# Patient Record
(demographics unavailable — no encounter records)

---

## 2025-03-12 NOTE — ASSESSMENT
[FreeTextEntry1] : 1. BPH -- with obstruction--improved with silodosin 2. LUTS --improved with silodosin 3. Elevated PVR -- improved on Silodosin   Plan: - Discussed PSA results, and importance of PSA screening - Maintain silodosin 8 MG PO QD - PSA 12 months - RTO 12 months - Patient has no other additional questions

## 2025-03-12 NOTE — HISTORY OF PRESENT ILLNESS
[Urinary Incontinence] : urinary incontinence [Urinary Frequency] : urinary frequency [Post-Void Dribbling] : post-void dribbling [None] : None [FreeTextEntry1] : 75-year-old male presents to the office for follow up of BPH/LUTS, s/p cystoscopy 08/2022 for hematuria, prostatic varices detected. He presents for PSA results. Patient is maintained on Silodosin 8 MG - is no longer on finasteride since 12/2022.  Patient reports doing well, he is using silodosin with improving urinary symptoms. He has no new urinary complaints. He reports good appetite, but he is not active as his likes because he has knee arthritis, and he is taking steroid injections, and he is told he needs partial knee replacement surgery.   He denies any fever, nausea, and other constitutional symptoms.   All past and present data reviewed: 04/2022 Urine Culture= negative 04/2022 Urine Cytology= negative for malignant cells  03/2022 urine analysis [PCP] = 10-20 red blood cells. 05/2023 UA= BLO trace intact   02/2025 PSA = 1.5 **** off proscar 02/2024 PSA = 2.4  **** off proscar 11/2022 PSA = 0.2  ***** proscar  12/2021 PSA = 0.2 ***** proscar //  BUN 18, CREAT 1.1, GFR 70 09/2020 PSA = 0.8 ***** proscar  05/2020 CT scan report reviewed= 4 mm right ureteral calculus. 06/2020 KUB= not  interpretable, see report. 06/2020 renal ultrasound= no hydronephrosis. 09/2020  24 hour urine= not done.  12/2022 bladder scan= 345 cc (voided 1 hr ago) 02/2023 bladder scan= 87 cc (voided 1 hr ago) 05/2023 bladder scan= 82 cc [Nocturia] : no nocturia [Weak Stream] : no weak stream [Intermittency] : no intermittency [Dysuria] : no dysuria

## 2025-03-12 NOTE — END OF VISIT
[Time Spent: ___ minutes] : I have spent [unfilled] minutes of time on the encounter which excludes teaching and separately reported services. [4. Prevent psychological harm to patient or others] : Reason - Prevent psychological harm to patient or others [FreeTextEntry3] : Patient notes was transcribed by medical scribcitlali Garcia under the supervision of Dr. Soliz. And I have reviewed the patient's chart and agree that it aligns with my medical decisions.

## 2025-03-12 NOTE — PHYSICAL EXAM
[General Appearance - Well Developed] : well developed [General Appearance - Well Nourished] : well nourished [Normal Appearance] : normal appearance [Well Groomed] : well groomed [Abdomen Soft] : soft [Costovertebral Angle Tenderness] : no ~M costovertebral angle tenderness [Penis Abnormality] : normal circumcised penis [Scrotum] : the scrotum was normal [Testes Tenderness] : no tenderness of the testes [Skin Color & Pigmentation] : normal skin color and pigmentation [Edema] : no peripheral edema [] : no respiratory distress [Respiration, Rhythm And Depth] : normal respiratory rhythm and effort [Oriented To Time, Place, And Person] : oriented to person, place, and time [Affect] : the affect was normal [Mood] : the mood was normal [Not Anxious] : not anxious [Normal Station and Gait] : the gait and station were normal for the patient's age [No Focal Deficits] : no focal deficits [Chaperone Declined] : Patient declined chaperone [FreeTextEntry1] : 05/2023 bladder scan= 82 cc

## 2025-05-21 NOTE — REASON FOR VISIT
[Follow-Up: _____] : a [unfilled] follow-up visit [Home] : at home, [unfilled] , at the time of the visit. [Medical Office: ([facility])___] : at the medical office located in  [Telehealth (audio & video)] : This visit was provided via telehealth using real-time 2-way audio visual technology. [Verbal consent obtained from patient] : the patient, [unfilled]

## 2025-05-21 NOTE — REASON FOR VISIT
[Follow-Up: _____] : a [unfilled] follow-up visit [Home] : at home, [unfilled] , at the time of the visit. [Medical Office: ([facility])___] : at the medical office located in  [Telehealth (audio & video)] : This visit was provided via telehealth using real-time 2-way audio visual technology. [Verbal consent obtained from patient] : the patient, [unfilled]

## 2025-05-21 NOTE — REASON FOR VISIT
[Follow-Up: _____] : a [unfilled] follow-up visit [Home] : at home, [unfilled] , at the time of the visit. [Medical Office: ([facility])___] : at the medical office located in  [Telehealth (audio & video)] : This visit was provided via telehealth using real-time 2-way audio visual technology. [Verbal consent obtained from patient] : the patient, [unfilled]

## 2025-05-22 NOTE — END OF VISIT
[Time Spent: ___ minutes] : I have spent [unfilled] minutes of time on the encounter which excludes teaching and separately reported services. [FreeTextEntry3] : I, MI Orlando personally performed the evaluation and management (E/M) services for this established patient who presents today with (a) new problem(s)/exacerbation of (an) existing condition(s).  That E/M includes conducting the clinically appropriate interval history &/or exam, assessing all new/exacerbated conditions, and establishing a new plan of care.  Today, my HORACIO, was here to observe &/or participate in the visit & follow plan of care established by me.

## 2025-05-22 NOTE — PROCEDURE
[FreeTextEntry1] : Dr. Dunn reviewed the indications for surgery, and used our webpage [email or web address] <[email or web address]> to illustrate the aorta and anatomy of the heart. Those indications are the following: size greater than 5.0 cm, symptomatic aneurysms, family history of aortic dissection or aneurysm death with a size greater than 4.5 cm, other necessary cardiac procedures such as coronary artery bypass grafting or valvular disorders with an aneurysm greater than 4.5 cm, or connective tissue disorders with an aneurysm size greater than 4.5 cm. The patient does not meet size criteria for surgical intervention at the time.  Dr. Dunn discussed activity restrictions with the patient, and would advise exercise at a moderate amount with no heavy lifting over one third of body weight, and avoiding heart rates that exceed 140 beats per minute. In addition, every patient should abstain from tobacco abuse and to avoid all illicit drug use, especially stimulants such as cocaine or methamphetamine. Dr. Dunn also counseled regarding maintaining a healthy heart diet, and losing any excessive weight as this also put undue stress on both the aorta and entire cardiovascular system. First degree family members should be screened for bicuspid valve disease, and ascending aortic aneurysms.   Patient was advised to view the educational video prior to this visit regarding aortic pathology, risk factors, surgical procedures, and lifestyle modifications. Video can be retrieved at [email or web address].

## 2025-05-22 NOTE — DATA REVIEWED
[FreeTextEntry1] : CTA chest 5/26/23: \par  Stable 4.6 cm dilated ascending thoracic aorta \par  *my measurement is 4.5-4.6 cm \par  \par  \par  CTA chest 5/13/21: stable ascending thoracic aorta 4.5x4.3cm. \par  \par  CTA chest 4/15/19 revealed: ascending thoracic aorta measuring 4.5-4.6cm. other findings as noted above unchanged. \par  \par  CTA chest 3/28/18 revealed: multiple stable findings as noted, including a 4.6cm ascending thoracic aortic aneurysm. \par  \par  CT chest w/o contrast 8/1/17\par  ascending TAA measured at 4.8cm, distal descending thoracic aorta measures 2.2cm, no signs of supraceliac AAA noted. \par  \par  Echocardiogram 8/31/17\par  Interpretation Summary\par  Normal left ventricular size and wall thickness.\par  The left ventricular wall motion is normal.\par  The left ventricular ejection fraction is estimated to be 60-65%\par  The mitral inflow pattern is consistent with impaired left ventricular relaxation with mildly elevated left atrial pressure (8-14mmHg).\par  The left atrial size is normal.\par  Right atrial size is normal.\par  The right ventricle is normal in size and function.\par  Structurally normal aortic valve.\par  No aortic regurgitation noted.\par  Structurally normal mitral valve.\par  No mitral regurgitation noted.\par  Structurally normal tricuspid valve.\par  There was insufficient TR detected from which to calculate pulmonary artery systolic pressure.\par  Structurally normal pulmonic valve.\par  No pulmonic regurgitation noted.\par  Mild aortic root dilatation.\par  The aortic root measures 4.3 cm at sinuses (normal less than 4 cm for men, less than 3.6 cm for women).\par  The ascending aorta is dilated.\par  Ascending aorta measures up to 4.6cm\par  The inferior vena cava is normal in size (<2.1 cm) with normal inspiratory collapse (>50%) consistent with normal right atrial pressure.\par  There is no pericardial effusion.

## 2025-05-22 NOTE — PROCEDURE
[FreeTextEntry1] : Dr. Dunn reviewed the indications for surgery, and used our webpage [email or web address] <[email or web address]> to illustrate the aorta and anatomy of the heart. Those indications are the following: size greater than 5.0 cm, symptomatic aneurysms, family history of aortic dissection or aneurysm death with a size greater than 4.5 cm, other necessary cardiac procedures such as coronary artery bypass grafting or valvular disorders with an aneurysm greater than 4.5 cm, or connective tissue disorders with an aneurysm size greater than 4.5 cm. The patient does not meet size criteria for surgical intervention at the time.  Dr. Dunn discussed activity restrictions with the patient, and would advise exercise at a moderate amount with no heavy lifting over one third of body weight, and avoiding heart rates that exceed 140 beats per minute. In addition, every patient should abstain from tobacco abuse and to avoid all illicit drug use, especially stimulants such as cocaine or methamphetamine. Dr. Dunn also counseled regarding maintaining a healthy heart diet, and losing any excessive weight as this also put undue stress on both the aorta and entire cardiovascular system. First degree family members should be screened for bicuspid valve disease, and ascending aortic aneurysms.   Patient was advised to view the educational video prior to this visit regarding aortic pathology, risk factors, surgical procedures, and lifestyle modifications. Video can be retrieved at [email or web address].be.

## 2025-05-22 NOTE — ASSESSMENT
[FreeTextEntry1] : 75-year-old male with a past medical history of HTN, BPH and morbid obesity, presents for two year follow up visit for evaluation and management of thoracic aortic aneurysm.   I have reviewed the patient's medical records, diagnostic images during the time of this office consultation and have made the following recommendation. CT completed of the thoracic aorta. The reports were reviewed and noted in the chart, I independently reviewed and interpreted images and reports, and I reviewed the films/CD and agree.  Review of the imaging shows his aortic pathology has remained stable and does not require surgical intervention.  Plan  - [facility] up [facility] with [facility]A chest.  - Continue medication regimen. - Follow up with cardiologist Dr. Vaughan and PCP. - Blood pressure management. - Discussed signs and symptoms that warrant emergency medical attention.

## 2025-05-22 NOTE — PROCEDURE
[FreeTextEntry1] : Dr. Dunn reviewed the indications for surgery, and used our webpage [email or web address] <[email or web address]> to illustrate the aorta and anatomy of the heart. Those indications are the following: size greater than 5.0 cm, symptomatic aneurysms, family history of aortic dissection or aneurysm death with a size greater than 4.5 cm, other necessary cardiac procedures such as coronary artery bypass grafting or valvular disorders with an aneurysm greater than 4.5 cm, or connective tissue disorders with an aneurysm size greater than 4.5 cm. The patient does not meet size criteria for surgical intervention at the time.  Dr. Dunn discussed activity restrictions with the patient, and would advise exercise at a moderate amount with no heavy lifting over one third of body weight, and avoiding heart rates that exceed 140 beats per minute. In addition, every patient should abstain from tobacco abuse and to avoid all illicit drug use, especially stimulants such as cocaine or methamphetamine. Dr. Dunn also counseled regarding maintaining a healthy heart diet, and losing any excessive weight as this also put undue stress on both the aorta and entire cardiovascular system. First degree family members should be screened for bicuspid valve disease, and ascending aortic aneurysms.   Patient was advised to view the educational video prior to this visit regarding aortic pathology, risk factors, surgical procedures, and lifestyle modifications. Video can be retrieved at [email or web address].be.

## 2025-05-22 NOTE — HISTORY OF PRESENT ILLNESS
[FreeTextEntry1] : 75-year-old male with a past medical history of HTN, BPH and morbid obesity, presents for two year follow up visit for evaluation and management of thoracic aortic aneurysm. Family hx of cerebral aneurysm (uncle). Last seen 2023.   CTA chest 5/12/2025: stable 4.8cm dilated ascending thoracic aorta.  *My measurements 4.6-4.7cm   Patient is doing well and denies recent hospitalization, ER visits, or surgeries. He denies fever, chills, fatigue, headache, blurred vision, dizziness, syncope, chest pain, palpitations, shortness of breath, orthopnea, paroxysmal nocturnal dyspnea, nausea, vomiting, abdominal pain, back pain, BRBPR or swelling to legs.